# Patient Record
Sex: FEMALE | Race: WHITE | Employment: FULL TIME | URBAN - METROPOLITAN AREA
[De-identification: names, ages, dates, MRNs, and addresses within clinical notes are randomized per-mention and may not be internally consistent; named-entity substitution may affect disease eponyms.]

---

## 2021-09-20 ENCOUNTER — HOSPITAL ENCOUNTER (OUTPATIENT)
Dept: LAB | Age: 31
Discharge: HOME | End: 2021-09-20
Payer: MEDICARE

## 2021-09-20 LAB
SARS-COV-2: NOT DETECTED
SOURCE: NORMAL

## 2021-09-20 PROCEDURE — U0003 INFECTIOUS AGENT DETECTION BY NUCLEIC ACID (DNA OR RNA); SEVERE ACUTE RESPIRATORY SYNDROME CORONAVIRUS 2 (SARS-COV-2) (CORONAVIRUS DISEASE [COVID-19]), AMPLIFIED PROBE TECHNIQUE, MAKING USE OF HIGH THROUGHPUT TECHNOLOGIES AS DESCRIBED BY CMS-2020-01-R: HCPCS

## 2021-09-20 PROCEDURE — U0005 INFEC AGEN DETEC AMPLI PROBE: HCPCS

## 2021-10-04 ENCOUNTER — APPOINTMENT (OUTPATIENT)
Dept: ULTRASOUND IMAGING | Age: 31
DRG: 560 | End: 2021-10-04
Payer: MEDICARE

## 2021-10-04 ENCOUNTER — HOSPITAL ENCOUNTER (OUTPATIENT)
Age: 31
Discharge: HOME | DRG: 560 | End: 2021-10-04
Attending: OBSTETRICS & GYNECOLOGY | Admitting: OBSTETRICS & GYNECOLOGY
Payer: MEDICARE

## 2021-10-04 VITALS
WEIGHT: 208 LBS | OXYGEN SATURATION: 97 % | HEIGHT: 62 IN | DIASTOLIC BLOOD PRESSURE: 63 MMHG | TEMPERATURE: 98.2 F | HEART RATE: 101 BPM | RESPIRATION RATE: 18 BRPM | SYSTOLIC BLOOD PRESSURE: 111 MMHG | BODY MASS INDEX: 38.28 KG/M2

## 2021-10-04 PROBLEM — Z33.1 PREGNANCY AS INCIDENTAL FINDING: Status: ACTIVE | Noted: 2021-10-04

## 2021-10-04 LAB
ALBUMIN SERPL-MCNC: 3.9 GM/DL (ref 3.4–5)
ALP BLD-CCNC: 136 IU/L (ref 40–129)
ALT SERPL-CCNC: 8 U/L (ref 10–40)
AMPHETAMINES: NEGATIVE
ANION GAP SERPL CALCULATED.3IONS-SCNC: 11 MMOL/L (ref 4–16)
AST SERPL-CCNC: 17 IU/L (ref 15–37)
BACTERIA: NEGATIVE /HPF
BARBITURATE SCREEN URINE: NEGATIVE
BENZODIAZEPINE SCREEN, URINE: NEGATIVE
BILIRUB SERPL-MCNC: 0.5 MG/DL (ref 0–1)
BILIRUBIN URINE: NEGATIVE MG/DL
BLOOD, URINE: NEGATIVE
BUN BLDV-MCNC: 7 MG/DL (ref 6–23)
CALCIUM SERPL-MCNC: 9 MG/DL (ref 8.3–10.6)
CANNABINOID SCREEN URINE: NEGATIVE
CHLORIDE BLD-SCNC: 104 MMOL/L (ref 99–110)
CLARITY: CLEAR
CO2: 20 MMOL/L (ref 21–32)
COCAINE METABOLITE: NEGATIVE
COLOR: YELLOW
CREAT SERPL-MCNC: 0.4 MG/DL (ref 0.6–1.1)
CREATININE URINE: 80.8 MG/DL (ref 28–217)
GFR AFRICAN AMERICAN: >60 ML/MIN/1.73M2
GFR NON-AFRICAN AMERICAN: >60 ML/MIN/1.73M2
GLUCOSE BLD-MCNC: 78 MG/DL (ref 70–99)
GLUCOSE, URINE: NEGATIVE MG/DL
HCT VFR BLD CALC: 36.8 % (ref 37–47)
HEMOGLOBIN: 12 GM/DL (ref 12.5–16)
KETONES, URINE: NEGATIVE MG/DL
LEUKOCYTE ESTERASE, URINE: ABNORMAL
MCH RBC QN AUTO: 31.1 PG (ref 27–31)
MCHC RBC AUTO-ENTMCNC: 32.6 % (ref 32–36)
MCV RBC AUTO: 95.3 FL (ref 78–100)
MUCUS: ABNORMAL HPF
NITRITE URINE, QUANTITATIVE: NEGATIVE
OPIATES, URINE: NEGATIVE
OXYCODONE: NEGATIVE
PDW BLD-RTO: 13.2 % (ref 11.7–14.9)
PH, URINE: 7 (ref 5–8)
PHENCYCLIDINE, URINE: NEGATIVE
PLATELET # BLD: 159 K/CU MM (ref 140–440)
PMV BLD AUTO: 11.9 FL (ref 7.5–11.1)
POTASSIUM SERPL-SCNC: 3.9 MMOL/L (ref 3.5–5.1)
PROT/CREAT RATIO, UR: 0.2
PROTEIN UA: NEGATIVE MG/DL
RBC # BLD: 3.86 M/CU MM (ref 4.2–5.4)
RBC URINE: <1 /HPF (ref 0–6)
SODIUM BLD-SCNC: 135 MMOL/L (ref 135–145)
SPECIFIC GRAVITY UA: 1.01 (ref 1–1.03)
SQUAMOUS EPITHELIAL: 3 /HPF
TOTAL PROTEIN: 6.4 GM/DL (ref 6.4–8.2)
TRICHOMONAS: ABNORMAL /HPF
URIC ACID: 3.7 MG/DL (ref 2.6–6)
URINE TOTAL PROTEIN: 12.8 MG/DL
UROBILINOGEN, URINE: NEGATIVE MG/DL (ref 0.2–1)
WBC # BLD: 17 K/CU MM (ref 4–10.5)
WBC UA: 6 /HPF (ref 0–5)

## 2021-10-04 PROCEDURE — 82570 ASSAY OF URINE CREATININE: CPT

## 2021-10-04 PROCEDURE — 84550 ASSAY OF BLOOD/URIC ACID: CPT

## 2021-10-04 PROCEDURE — 84156 ASSAY OF PROTEIN URINE: CPT

## 2021-10-04 PROCEDURE — 76815 OB US LIMITED FETUS(S): CPT

## 2021-10-04 PROCEDURE — 85027 COMPLETE CBC AUTOMATED: CPT

## 2021-10-04 PROCEDURE — 81050 URINALYSIS VOLUME MEASURE: CPT

## 2021-10-04 PROCEDURE — 81001 URINALYSIS AUTO W/SCOPE: CPT

## 2021-10-04 PROCEDURE — 99203 OFFICE O/P NEW LOW 30 MIN: CPT

## 2021-10-04 PROCEDURE — 80053 COMPREHEN METABOLIC PANEL: CPT

## 2021-10-04 PROCEDURE — 80307 DRUG TEST PRSMV CHEM ANLYZR: CPT

## 2021-10-04 RX ORDER — ASPIRIN 81 MG/1
81 TABLET ORAL DAILY
Status: ON HOLD | COMMUNITY
End: 2021-10-08 | Stop reason: HOSPADM

## 2021-10-04 RX ORDER — ACYCLOVIR 200 MG/1
1000 CAPSULE ORAL DAILY
Status: ON HOLD | COMMUNITY
End: 2021-10-08 | Stop reason: HOSPADM

## 2021-10-04 ASSESSMENT — PAIN DESCRIPTION - ORIENTATION: ORIENTATION: OTHER (COMMENT)

## 2021-10-04 ASSESSMENT — PAIN DESCRIPTION - ONSET: ONSET: ON-GOING

## 2021-10-04 ASSESSMENT — PAIN DESCRIPTION - DESCRIPTORS: DESCRIPTORS: DISCOMFORT

## 2021-10-04 ASSESSMENT — PAIN DESCRIPTION - FREQUENCY: FREQUENCY: OTHER (COMMENT)

## 2021-10-04 ASSESSMENT — PAIN DESCRIPTION - PAIN TYPE: TYPE: CHRONIC PAIN

## 2021-10-04 ASSESSMENT — PAIN DESCRIPTION - LOCATION: LOCATION: HEAD

## 2021-10-04 ASSESSMENT — PAIN DESCRIPTION - PROGRESSION: CLINICAL_PROGRESSION: GRADUALLY IMPROVING

## 2021-10-04 ASSESSMENT — PAIN - FUNCTIONAL ASSESSMENT: PAIN_FUNCTIONAL_ASSESSMENT: ACTIVITIES ARE NOT PREVENTED

## 2021-10-04 ASSESSMENT — PAIN SCALES - GENERAL: PAINLEVEL_OUTOF10: 1

## 2021-10-04 NOTE — FLOWSHEET NOTE
Tele  advised of serial B/P and lab work orders received for pt to be discharged home with 24 hour urine and return it tomorrow for serial B/P and NST.

## 2021-10-04 NOTE — FLOWSHEET NOTE
EFM and TOCO on pt states baby has been active and moving,denies any vaginal leaking or bleeding, denies any tender spots to touch on abdomen pt states has had headache off and on beginning on Saturday williams, denies any blurred vision , epigastric discomfort, states B/P has been fine up until this past weekend, POC discussed Ob history taken juice and water given, call light within reach.

## 2021-10-04 NOTE — FLOWSHEET NOTE
EFM And TOCO Off discharge instructions given instructions given regarding beginning, collecting and storing 24 hour urine to return tomorrow to drop off 24 hour urine and have NST and serial B/Ps pt voices understanding, instructed to return to L/D prior to dropping off 24 hour urine if baby not moving at  Least 4-5 times an hour, return if has any vaginal bleeding or leaking   advised may have some spotting if has IC but to return if has gushing of blood or fluid, return if having UC every 4-5 minutes times one hour and cant walk or talk through  Them, also to return If experiences headache , blurred vision or epigastric discomfort, pt voices understanding, discharge paper signed and pt preparing for discharge.

## 2021-10-04 NOTE — FLOWSHEET NOTE
Presents to L/D ambulatory with c/o increased B/P over the weekend with home B/P machine, shown to LT 02 instructed on obtaining urine for CCMS.

## 2021-10-05 ENCOUNTER — HOSPITAL ENCOUNTER (OUTPATIENT)
Age: 31
Discharge: HOME | DRG: 560 | End: 2021-10-05
Attending: OBSTETRICS & GYNECOLOGY | Admitting: OBSTETRICS & GYNECOLOGY
Payer: MEDICARE

## 2021-10-05 VITALS
DIASTOLIC BLOOD PRESSURE: 82 MMHG | BODY MASS INDEX: 38.28 KG/M2 | HEIGHT: 62 IN | SYSTOLIC BLOOD PRESSURE: 127 MMHG | TEMPERATURE: 98.4 F | OXYGEN SATURATION: 98 % | WEIGHT: 208 LBS | HEART RATE: 100 BPM

## 2021-10-05 PROBLEM — Z78.9 ADMITTED TO LABOR AND DELIVERY: Status: ACTIVE | Noted: 2021-10-05

## 2021-10-05 LAB
CREATININE 24 HOUR UR: 1.6 GM/24 HR (ref 0.6–1.5)
Lab: 24 HRS
PROTEIN 24 HOUR URINE: 278 MG/24 HR
VOLUME, (UVOL): 1350 MLS

## 2021-10-05 PROCEDURE — 59025 FETAL NON-STRESS TEST: CPT

## 2021-10-05 NOTE — FLOWSHEET NOTE
on unit serial B/P reviewed  24 hour urine results  orders received for pt to be discharged an follow up  In office this week for B/P check and will discuss induction.

## 2021-10-05 NOTE — FLOWSHEET NOTE
Patient ambulatory to labor and delivery unit. Patient here to drop off a 24hr unine, scheduled NST, and sereal BP's. EFM and TOCO applied and tracing. Patient abdomen is nontender to touch. She denies any painful contractions, leaking of fluid, or bleeding. Patient denies any headache today. Bilateral lower extremity reflex's brisk at assessment. Negative for clonus. Dr Michelle Martins at nurses station and notified of patient arrival. Awaiting orders. POC discussed with patient. She verbalizes understanding and denies any questions at this time.

## 2021-10-05 NOTE — FLOWSHEET NOTE
EFM and TOCO off discharge instructions given to follow up as scheduled in office on Thursday and will discuss  Possible induction, instructed to return to l/D if develops headache , blurred vision, or epigastric discomfort, instructed to return if baby not moving at least 4-5 times an hour and cant walk or talk through them , baby not moving 4-5 an hour or if has any vaginal leakiing or bleeding, pt voices understanding discharge paper signed and pt preparing for discharge

## 2021-10-07 ENCOUNTER — ANESTHESIA EVENT (OUTPATIENT)
Dept: LABOR AND DELIVERY | Age: 31
DRG: 560 | End: 2021-10-07
Payer: MEDICARE

## 2021-10-07 ENCOUNTER — ANESTHESIA (OUTPATIENT)
Dept: LABOR AND DELIVERY | Age: 31
DRG: 560 | End: 2021-10-07
Payer: MEDICARE

## 2021-10-07 ENCOUNTER — HOSPITAL ENCOUNTER (INPATIENT)
Age: 31
LOS: 2 days | Discharge: HOME | DRG: 560 | End: 2021-10-09
Attending: OBSTETRICS & GYNECOLOGY | Admitting: OBSTETRICS & GYNECOLOGY
Payer: MEDICARE

## 2021-10-07 LAB
ABO/RH: NORMAL
ALBUMIN SERPL-MCNC: 3.7 GM/DL (ref 3.4–5)
ALP BLD-CCNC: 136 IU/L (ref 40–129)
ALT SERPL-CCNC: 8 U/L (ref 10–40)
AMORPHOUS: ABNORMAL /HPF
AMPHETAMINES: NEGATIVE
ANION GAP SERPL CALCULATED.3IONS-SCNC: 13 MMOL/L (ref 4–16)
ANTIBODY SCREEN: NEGATIVE
AST SERPL-CCNC: 21 IU/L (ref 15–37)
BACTERIA: NEGATIVE /HPF
BARBITURATE SCREEN URINE: NEGATIVE
BENZODIAZEPINE SCREEN, URINE: NEGATIVE
BILIRUB SERPL-MCNC: 0.2 MG/DL (ref 0–1)
BILIRUBIN URINE: NEGATIVE MG/DL
BLOOD, URINE: ABNORMAL
BUN BLDV-MCNC: 11 MG/DL (ref 6–23)
CALCIUM SERPL-MCNC: 9.1 MG/DL (ref 8.3–10.6)
CANNABINOID SCREEN URINE: NEGATIVE
CHLORIDE BLD-SCNC: 104 MMOL/L (ref 99–110)
CLARITY: ABNORMAL
CO2: 18 MMOL/L (ref 21–32)
COCAINE METABOLITE: NEGATIVE
COLOR: YELLOW
CREAT SERPL-MCNC: 0.4 MG/DL (ref 0.6–1.1)
GFR AFRICAN AMERICAN: >60 ML/MIN/1.73M2
GFR NON-AFRICAN AMERICAN: >60 ML/MIN/1.73M2
GLUCOSE BLD-MCNC: 89 MG/DL (ref 70–99)
GLUCOSE, URINE: NEGATIVE MG/DL
HCT VFR BLD CALC: 35 % (ref 37–47)
HEMOGLOBIN: 11.3 GM/DL (ref 12.5–16)
KETONES, URINE: NEGATIVE MG/DL
LEUKOCYTE ESTERASE, URINE: ABNORMAL
MCH RBC QN AUTO: 30.5 PG (ref 27–31)
MCHC RBC AUTO-ENTMCNC: 32.3 % (ref 32–36)
MCV RBC AUTO: 94.3 FL (ref 78–100)
MUCUS: ABNORMAL HPF
NITRITE URINE, QUANTITATIVE: NEGATIVE
OPIATES, URINE: NEGATIVE
OXYCODONE: NEGATIVE
PDW BLD-RTO: 13.3 % (ref 11.7–14.9)
PH, URINE: 6 (ref 5–8)
PHENCYCLIDINE, URINE: NEGATIVE
PLATELET # BLD: 175 K/CU MM (ref 140–440)
PMV BLD AUTO: 11.7 FL (ref 7.5–11.1)
POTASSIUM SERPL-SCNC: 3.9 MMOL/L (ref 3.5–5.1)
PROTEIN UA: NEGATIVE MG/DL
RBC # BLD: 3.71 M/CU MM (ref 4.2–5.4)
RBC URINE: <1 /HPF (ref 0–6)
SODIUM BLD-SCNC: 135 MMOL/L (ref 135–145)
SPECIFIC GRAVITY UA: 1.02 (ref 1–1.03)
SQUAMOUS EPITHELIAL: 2 /HPF
TOTAL PROTEIN: 6.2 GM/DL (ref 6.4–8.2)
TRICHOMONAS: ABNORMAL /HPF
URIC ACID: 4.1 MG/DL (ref 2.6–6)
UROBILINOGEN, URINE: NEGATIVE MG/DL (ref 0.2–1)
WBC # BLD: 16.2 K/CU MM (ref 4–10.5)
WBC UA: 12 /HPF (ref 0–5)

## 2021-10-07 PROCEDURE — 84550 ASSAY OF BLOOD/URIC ACID: CPT

## 2021-10-07 PROCEDURE — 81001 URINALYSIS AUTO W/SCOPE: CPT

## 2021-10-07 PROCEDURE — 85027 COMPLETE CBC AUTOMATED: CPT

## 2021-10-07 PROCEDURE — 7200000001 HC VAGINAL DELIVERY

## 2021-10-07 PROCEDURE — 86900 BLOOD TYPING SEROLOGIC ABO: CPT

## 2021-10-07 PROCEDURE — 6360000002 HC RX W HCPCS: Performed by: OBSTETRICS & GYNECOLOGY

## 2021-10-07 PROCEDURE — 1220000000 HC SEMI PRIVATE OB R&B

## 2021-10-07 PROCEDURE — 10907ZC DRAINAGE OF AMNIOTIC FLUID, THERAPEUTIC FROM PRODUCTS OF CONCEPTION, VIA NATURAL OR ARTIFICIAL OPENING: ICD-10-PCS | Performed by: OBSTETRICS & GYNECOLOGY

## 2021-10-07 PROCEDURE — 2580000003 HC RX 258: Performed by: OBSTETRICS & GYNECOLOGY

## 2021-10-07 PROCEDURE — 6360000002 HC RX W HCPCS: Performed by: NURSE ANESTHETIST, CERTIFIED REGISTERED

## 2021-10-07 PROCEDURE — 80053 COMPREHEN METABOLIC PANEL: CPT

## 2021-10-07 PROCEDURE — 51702 INSERT TEMP BLADDER CATH: CPT

## 2021-10-07 PROCEDURE — 6370000000 HC RX 637 (ALT 250 FOR IP): Performed by: OBSTETRICS & GYNECOLOGY

## 2021-10-07 PROCEDURE — 86901 BLOOD TYPING SEROLOGIC RH(D): CPT

## 2021-10-07 PROCEDURE — 80307 DRUG TEST PRSMV CHEM ANLYZR: CPT

## 2021-10-07 PROCEDURE — 86850 RBC ANTIBODY SCREEN: CPT

## 2021-10-07 RX ORDER — HYDROCODONE BITARTRATE AND ACETAMINOPHEN 5; 325 MG/1; MG/1
2 TABLET ORAL EVERY 4 HOURS PRN
Status: DISCONTINUED | OUTPATIENT
Start: 2021-10-07 | End: 2021-10-09 | Stop reason: HOSPADM

## 2021-10-07 RX ORDER — DOCUSATE SODIUM 100 MG/1
100 CAPSULE, LIQUID FILLED ORAL 2 TIMES DAILY
Status: DISCONTINUED | OUTPATIENT
Start: 2021-10-07 | End: 2021-10-09 | Stop reason: HOSPADM

## 2021-10-07 RX ORDER — ROPIVACAINE HYDROCHLORIDE 2 MG/ML
INJECTION, SOLUTION EPIDURAL; INFILTRATION; PERINEURAL PRN
Status: DISCONTINUED | OUTPATIENT
Start: 2021-10-07 | End: 2021-10-07 | Stop reason: SDUPTHER

## 2021-10-07 RX ORDER — ROPIVACAINE HYDROCHLORIDE 2 MG/ML
12 INJECTION, SOLUTION EPIDURAL; INFILTRATION; PERINEURAL CONTINUOUS
Status: DISCONTINUED | OUTPATIENT
Start: 2021-10-07 | End: 2021-10-09 | Stop reason: HOSPADM

## 2021-10-07 RX ORDER — ONDANSETRON 4 MG/1
8 TABLET, ORALLY DISINTEGRATING ORAL EVERY 8 HOURS PRN
Status: DISCONTINUED | OUTPATIENT
Start: 2021-10-07 | End: 2021-10-09 | Stop reason: HOSPADM

## 2021-10-07 RX ORDER — FENTANYL CITRATE 50 UG/ML
100 INJECTION, SOLUTION INTRAMUSCULAR; INTRAVENOUS
Status: DISCONTINUED | OUTPATIENT
Start: 2021-10-07 | End: 2021-10-07 | Stop reason: HOSPADM

## 2021-10-07 RX ORDER — FERROUS SULFATE 325(65) MG
325 TABLET ORAL 2 TIMES DAILY WITH MEALS
Status: DISCONTINUED | OUTPATIENT
Start: 2021-10-07 | End: 2021-10-09 | Stop reason: HOSPADM

## 2021-10-07 RX ORDER — LANOLIN 100 %
OINTMENT (GRAM) TOPICAL PRN
Status: DISCONTINUED | OUTPATIENT
Start: 2021-10-07 | End: 2021-10-09 | Stop reason: HOSPADM

## 2021-10-07 RX ORDER — SIMETHICONE 80 MG
80 TABLET,CHEWABLE ORAL EVERY 6 HOURS PRN
Status: DISCONTINUED | OUTPATIENT
Start: 2021-10-07 | End: 2021-10-09 | Stop reason: HOSPADM

## 2021-10-07 RX ORDER — ONDANSETRON 2 MG/ML
4 INJECTION INTRAMUSCULAR; INTRAVENOUS EVERY 6 HOURS PRN
Status: DISCONTINUED | OUTPATIENT
Start: 2021-10-07 | End: 2021-10-07 | Stop reason: HOSPADM

## 2021-10-07 RX ORDER — SODIUM CHLORIDE 9 MG/ML
25 INJECTION, SOLUTION INTRAVENOUS PRN
Status: DISCONTINUED | OUTPATIENT
Start: 2021-10-07 | End: 2021-10-09 | Stop reason: HOSPADM

## 2021-10-07 RX ORDER — SODIUM CHLORIDE 0.9 % (FLUSH) 0.9 %
10 SYRINGE (ML) INJECTION EVERY 12 HOURS SCHEDULED
Status: DISCONTINUED | OUTPATIENT
Start: 2021-10-07 | End: 2021-10-09 | Stop reason: HOSPADM

## 2021-10-07 RX ORDER — SODIUM CHLORIDE 0.9 % (FLUSH) 0.9 %
10 SYRINGE (ML) INJECTION PRN
Status: DISCONTINUED | OUTPATIENT
Start: 2021-10-07 | End: 2021-10-09 | Stop reason: HOSPADM

## 2021-10-07 RX ORDER — IBUPROFEN 800 MG/1
800 TABLET ORAL EVERY 8 HOURS
Status: DISCONTINUED | OUTPATIENT
Start: 2021-10-07 | End: 2021-10-09 | Stop reason: HOSPADM

## 2021-10-07 RX ORDER — BISACODYL 10 MG
10 SUPPOSITORY, RECTAL RECTAL DAILY PRN
Status: DISCONTINUED | OUTPATIENT
Start: 2021-10-07 | End: 2021-10-09 | Stop reason: HOSPADM

## 2021-10-07 RX ORDER — SODIUM CHLORIDE, SODIUM LACTATE, POTASSIUM CHLORIDE, CALCIUM CHLORIDE 600; 310; 30; 20 MG/100ML; MG/100ML; MG/100ML; MG/100ML
INJECTION, SOLUTION INTRAVENOUS CONTINUOUS
Status: DISCONTINUED | OUTPATIENT
Start: 2021-10-07 | End: 2021-10-09 | Stop reason: HOSPADM

## 2021-10-07 RX ORDER — LIDOCAINE HYDROCHLORIDE 10 MG/ML
30 INJECTION, SOLUTION EPIDURAL; INFILTRATION; INTRACAUDAL; PERINEURAL PRN
Status: DISCONTINUED | OUTPATIENT
Start: 2021-10-07 | End: 2021-10-09 | Stop reason: HOSPADM

## 2021-10-07 RX ORDER — HYDROCODONE BITARTRATE AND ACETAMINOPHEN 5; 325 MG/1; MG/1
1 TABLET ORAL EVERY 4 HOURS PRN
Status: DISCONTINUED | OUTPATIENT
Start: 2021-10-07 | End: 2021-10-09 | Stop reason: HOSPADM

## 2021-10-07 RX ORDER — ACETAMINOPHEN 325 MG/1
650 TABLET ORAL EVERY 4 HOURS PRN
Status: DISCONTINUED | OUTPATIENT
Start: 2021-10-07 | End: 2021-10-09 | Stop reason: HOSPADM

## 2021-10-07 RX ADMIN — Medication 87.3 MILLI-UNITS/MIN: at 11:23

## 2021-10-07 RX ADMIN — AMPICILLIN SODIUM 1000 MG: 1 INJECTION, POWDER, FOR SOLUTION INTRAMUSCULAR; INTRAVENOUS at 09:56

## 2021-10-07 RX ADMIN — AMPICILLIN SODIUM 2000 MG: 2 INJECTION, POWDER, FOR SOLUTION INTRAMUSCULAR; INTRAVENOUS at 05:15

## 2021-10-07 RX ADMIN — ROPIVACAINE HYDROCHLORIDE 8 ML: 2 INJECTION, SOLUTION EPIDURAL; INFILTRATION at 07:52

## 2021-10-07 RX ADMIN — SODIUM CHLORIDE, POTASSIUM CHLORIDE, SODIUM LACTATE AND CALCIUM CHLORIDE: 600; 310; 30; 20 INJECTION, SOLUTION INTRAVENOUS at 07:57

## 2021-10-07 RX ADMIN — Medication 1 MILLI-UNITS/MIN: at 10:27

## 2021-10-07 RX ADMIN — SODIUM CHLORIDE, POTASSIUM CHLORIDE, SODIUM LACTATE AND CALCIUM CHLORIDE: 600; 310; 30; 20 INJECTION, SOLUTION INTRAVENOUS at 05:00

## 2021-10-07 RX ADMIN — DOCUSATE SODIUM 100 MG: 100 CAPSULE ORAL at 20:28

## 2021-10-07 RX ADMIN — IBUPROFEN 800 MG: 800 TABLET, FILM COATED ORAL at 18:40

## 2021-10-07 RX ADMIN — ROPIVACAINE HYDROCHLORIDE 12 ML/HR: 2 INJECTION, SOLUTION EPIDURAL; INFILTRATION at 07:53

## 2021-10-07 RX ADMIN — SODIUM CHLORIDE, PRESERVATIVE FREE 10 ML: 5 INJECTION INTRAVENOUS at 20:29

## 2021-10-07 ASSESSMENT — PAIN SCALES - GENERAL
PAINLEVEL_OUTOF10: 0
PAINLEVEL_OUTOF10: 0
PAINLEVEL_OUTOF10: 6
PAINLEVEL_OUTOF10: 0

## 2021-10-07 ASSESSMENT — PAIN DESCRIPTION - DESCRIPTORS: DESCRIPTORS: CRAMPING

## 2021-10-07 ASSESSMENT — PAIN - FUNCTIONAL ASSESSMENT: PAIN_FUNCTIONAL_ASSESSMENT: ACTIVITIES ARE NOT PREVENTED

## 2021-10-07 ASSESSMENT — PAIN DESCRIPTION - FREQUENCY: FREQUENCY: CONTINUOUS

## 2021-10-07 ASSESSMENT — PAIN DESCRIPTION - LOCATION: LOCATION: ABDOMEN

## 2021-10-07 ASSESSMENT — PAIN DESCRIPTION - ONSET: ONSET: ON-GOING

## 2021-10-07 ASSESSMENT — PAIN DESCRIPTION - PAIN TYPE: TYPE: ACUTE PAIN

## 2021-10-07 ASSESSMENT — PAIN DESCRIPTION - PROGRESSION: CLINICAL_PROGRESSION: GRADUALLY WORSENING

## 2021-10-07 ASSESSMENT — PAIN DESCRIPTION - ORIENTATION: ORIENTATION: LOWER

## 2021-10-07 NOTE — FLOWSHEET NOTE
Epidural:    CRNA in room: 1 [facility]  Cath: [id number]  Test dose: 9706  Bolus: 1968    RN remained at bedside. Pt tolerated procedure well.

## 2021-10-07 NOTE — H&P
Department of Obstetrics and Gynecology   Obstetrics History and Physical        CHIEF COMPLAINT:  contractions    HISTORY OF PRESENT ILLNESS:      The patient is a 32 y.o. female at 38w3d. OB History        2    Para   1    Term   1            AB        Living           SAB        TAB        Ectopic        Molar        Multiple        Live Births                Patient presents with a chief complaint as above and is being admitted for active phase labor    Estimated Due Date: Estimated Date of Delivery: 10/11/21    PRENATAL CARE:    Complicated by: HSV - on suppression, asthma    PAST OB HISTORY  OB History        2    Para   1    Term   1            AB        Living           SAB        TAB        Ectopic        Molar        Multiple        Live Births                    Past Medical History:        Diagnosis Date    Asthma      Past Surgical History:    No past surgical history on file. Allergies:  Patient has no known allergies. Social History:    Social History     Socioeconomic History    Marital status:      Spouse name: Not on file    Number of children: Not on file    Years of education: Not on file    Highest education level: Not on file   Occupational History    Not on file   Tobacco Use    Smoking status: Never Smoker   Substance and Sexual Activity    Alcohol use: No    Drug use: No    Sexual activity: Yes     Partners: Male   Other Topics Concern    Not on file   Social History Narrative    Not on file     Social Determinants of Health     Financial Resource Strain:     Difficulty of Paying Living Expenses:    Food Insecurity:     Worried About Running Out of Food in the Last Year:     [address] in the Last Year:    Transportation Needs:     Lack of Transportation (Medical):      Lack of Transportation (Non-Medical):    Physical Activity:     Days of Exercise per Week:     Minutes of Exercise per Session:    Stress:     Feeling of Stress : Social Connections:     Frequency of Communication with Friends and Family:     Frequency of Social Gatherings with Friends and Family:     Attends [employer] Services:     Active Member of Clubs or Organizations:     Attends Club or Organization Meetings:     Marital Status:    Intimate Partner Violence:     Fear of Current or Ex-Partner:     Emotionally Abused:     Physically Abused:     Sexually Abused:      Family History:   No family history on file. Medications Prior to Admission:  Medications Prior to Admission: aspirin 81 MG EC tablet, Take 81 mg by mouth daily  albuterol sulfate  (90 Base) MCG/ACT inhaler, Inhale 2 puffs into the lungs every 6 hours as needed for Wheezing or Shortness of Breath (or cough) Please include spacer with instructions for use. calcium carbonate (TUMS) 500 MG chewable tablet, Take 1 tablet by mouth daily  Prenatal MV-Min-Fe Fum-FA-DHA (PRENATAL 1 PO), Take by mouth  IRON PO, Take by mouth  acyclovir (ZOVIRAX) 200 MG capsule, Take 1,000 mg by mouth daily  Dextromethorphan-guaiFENesin (ROBITUSSIN DM)  MG/5ML SYRP, Take 5 mLs by mouth every 4 hours as needed for Cough    REVIEW OF SYSTEMS:    CONSTITUTIONAL:  negative  RESPIRATORY:  negative  CARDIOVASCULAR:  negative  GASTROINTESTINAL:  negative  ALLERGIC/IMMUNOLOGIC:  negative  NEUROLOGICAL:  negative  BEHAVIOR/PSYCH:  negative    PHYSICAL EXAM:  Blood pressure (!) 142/88, pulse 106, temperature 98.2 °F (36.8 °C), temperature source Oral, resp. rate 18, height 5' 2\" (1.575 m), weight 210 lb (95.3 kg), SpO2 99 %, unknown if currently breastfeeding. General appearance:  awake, alert, cooperative, no apparent distress, and appears stated age  Neurologic:  Awake, alert, oriented to name, place and time.     Lungs:  No increased work of breathing, good air exchange  Abdomen:  Soft, non tender, gravid, consistent with her gestational age  Fetal heart rate:    Baseline Heart Rate:   Category 1 tracing     Pelvis: Adequate pelvis  Cervix: 7 cm  With AROM around 0715 this am  Contraction frequency:  2 minutes    Membranes:  Ruptured clear fluid    ASSESSMENT AND PLAN:    Labor: Admit, anticipate normal delivery, routine labor orders  Fetus: Reassuring  GBS: Yes  Other: IV antibiotic therapy

## 2021-10-07 NOTE — PLAN OF CARE
Problem: Anxiety:  Goal: Level of anxiety will decrease  Description: Level of anxiety will decrease  10/7/2021 1405 by Debi Price RN  Outcome: Met This Shift  10/7/2021 0517 by Mamta Ng RN  Outcome: Ongoing     Problem: Breathing Pattern - Ineffective:  Goal: Able to breathe comfortably  Description: Able to breathe comfortably  10/7/2021 1405 by Debi Price RN  Outcome: Met This Shift  10/7/2021 0517 by Mamta Ng RN  Outcome: Ongoing     Problem: Fluid Volume - Imbalance:  Goal: Absence of imbalanced fluid volume signs and symptoms  Description: Absence of imbalanced fluid volume signs and symptoms  10/7/2021 1405 by Debi Price RN  Outcome: Met This Shift  10/7/2021 0517 by Mamta Ng RN  Outcome: Ongoing  Goal: Absence of intrapartum hemorrhage signs and symptoms  Description: Absence of intrapartum hemorrhage signs and symptoms  10/7/2021 1405 by Debi Price RN  Outcome: Met This Shift  10/7/2021 0517 by Mamta Ng RN  Outcome: Ongoing     Problem: Infection - Intrapartum Infection:  Goal: Will show no infection signs and symptoms  Description: Will show no infection signs and symptoms  10/7/2021 1405 by Debi Price RN  Outcome: Met This Shift  10/7/2021 0517 by Mamta Ng RN  Outcome: Ongoing     Problem: Labor Process - Prolonged:  Goal: Labor progression, first stage, within specified pattern  Description: Labor progression, first stage, within specified pattern  10/7/2021 1405 by Debi Price RN  Outcome: Met This Shift  10/7/2021 0517 by Mamta Ng RN  Outcome: Ongoing  Goal: Labor progession, second stage, within specified pattern  Description: Labor progession, second stage, within specified pattern  10/7/2021 1405 by Debi Price RN  Outcome: Met This Shift  10/7/2021 0517 by Mamta Ng RN  Outcome: Ongoing  Goal: Uterine contractions within specified parameters  Description: Uterine contractions within specified parameters  10/7/2021 1405 by Bhargavi Costello RN  Outcome: Met This Shift  10/7/2021 0517 by Carmen Rodrigez RN  Outcome: Ongoing     Problem:  Screening:  Goal: Ability to make informed decisions regarding treatment has improved  Description: Ability to make informed decisions regarding treatment has improved  10/7/2021 140 by Bhargavi Costello RN  Outcome: Met This Shift  10/7/2021 0517 by Carmen Rodrigez RN  Outcome: Ongoing     Problem: Pain - Acute:  Goal: Pain level will decrease  Description: Pain level will decrease  10/7/2021 1405 by Bhargavi Costello RN  Outcome: Met This Shift  10/7/2021 0517 by Carmen Rodrigez RN  Outcome: Ongoing  Goal: Able to cope with pain  Description: Able to cope with pain  10/7/2021 1405 by Bhargavi Costello RN  Outcome: Met This Shift  10/7/2021 0517 by Carmen Rodrigez RN  Outcome: Ongoing     Problem: Tissue Perfusion - Uteroplacental, Altered:  Description: [TRUNCATED] For intrapartum patients with recurrent variable decelerations of the fetal heart rate, consider transcervical amnioinfusion. For patients in labor, avoid prophylactic use of continuous maternal oxygen supplementation to prevent nonreassu . ..   Goal: Absence of abnormal fetal heart rate pattern  Description: Absence of abnormal fetal heart rate pattern  10/7/2021 1405 by Bhargavi Costello RN  Outcome: Met This Shift  10/7/2021 0517 by Carmen Rodrigez RN  Outcome: Ongoing     Problem: Urinary Retention:  Goal: Experiences of bladder distention will decrease  Description: Experiences of bladder distention will decrease  10/7/2021 140 by Bhargavi Costello RN  Outcome: Met This Shift  10/7/2021 0517 by Carmen Rodrigez RN  Outcome: Ongoing  Goal: Urinary elimination within specified parameters  Description: Urinary elimination within specified parameters  10/7/2021 1405 by Bhargavi Costello RN  Outcome: Met This Shift  10/7/2021 0517 by Carmen Rodrigez RN  Outcome: Ongoing Problem: Pain:  Description: Pain management should include both nonpharmacologic and pharmacologic interventions.   Goal: Pain level will decrease  Description: Pain level will decrease  10/7/2021 1405 by Lorne Lewis RN  Outcome: Met This Shift  10/7/2021 0517 by Rafaela Dickinson RN  Outcome: Ongoing  Goal: Control of acute pain  Description: Control of acute pain  10/7/2021 1405 by Lorne Lewis RN  Outcome: Met This Shift  10/7/2021 0517 by Rafaela Dickinson RN  Outcome: Ongoing  Goal: Control of chronic pain  Description: Control of chronic pain  10/7/2021 1405 by Lorne Lewis RN  Outcome: Met This Shift  10/7/2021 0517 by Rafaela Dickinson RN  Outcome: Ongoing

## 2021-10-07 NOTE — ANESTHESIA PRE PROCEDURE
Department of Anesthesiology  Preprocedure Note       Name:  Patria Paige   Age:  32 y.o.  :  1990                                          MRN:  [id number]         Date:  10/7/2021      Surgeon: * No surgeons listed *    Procedure: * No procedures listed *    Medications prior to admission:   Prior to Admission medications    Medication Sig Start Date End Date Taking? Authorizing Provider   aspirin 81 MG EC tablet Take 81 mg by mouth daily   Yes Historical Provider, MD   albuterol sulfate  (90 Base) MCG/ACT inhaler Inhale 2 puffs into the lungs every 6 hours as needed for Wheezing or Shortness of Breath (or cough) Please include spacer with instructions for use.  10/30/17  Yes Racheal Rodriguez PA-C   calcium carbonate (TUMS) 500 MG chewable tablet Take 1 tablet by mouth daily   Yes Historical Provider, MD   Prenatal MV-Min-Fe Fum-FA-DHA (PRENATAL 1 PO) Take by mouth   Yes Historical Provider, MD   IRON PO Take by mouth   Yes Historical Provider, MD   acyclovir (ZOVIRAX) 200 MG capsule Take 1,000 mg by mouth daily    Historical Provider, MD   Dextromethorphan-guaiFENesin (ROBITUSSIN DM)  MG/5ML SYRP Take 5 mLs by mouth every 4 hours as needed for Cough 10/30/17   Racheal Rodriguez PA-C       Current medications:    Current Facility-Administered Medications   Medication Dose Route Frequency Provider Last Rate Last Admin    lactated ringers infusion   IntraVENous Continuous [facility] Brandee Santos  mL/hr at 10/07/21 0500 New Bag at 10/07/21 0500    oxytocin (PITOCIN) 30 units in 500 mL infusion  1-20 spenser-units/min IntraVENous Continuous Eliz Metzger MD   Held at 10/07/21 0541    oxytocin (PITOCIN) 30 units in 500 mL infusion  87.3 spenser-units/min IntraVENous Continuous PRN Eliz Metzger MD        And    oxytocin (PITOCIN) 10 unit bolus from the bag  10 Units IntraVENous PRN Eliz Metzger MD        lidocaine PF 1 % injection 30 mL  30 mL Other PRN Katherine Little MD        fentaNYL (SUBLIMAZE) injection 100 mcg  100 mcg IntraVENous Q1H PRN Katherine Little MD        famotidine (PEPCID) injection 20 mg  20 mg IntraVENous BID PRN Katherine Little MD        [facility]etron [facility]) injection 4 mg  4 mg IntraVENous Q6H PRN Katherine Little MD        ampicillin 1000 mg ivpb mini bag  1,000 mg IntraVENous Q4H Andrew Montoya MD           Allergies:  No Known Allergies    Problem List:    Patient Active Problem List   Diagnosis Code    Gestational hypertension w/o significant proteinuria in 3rd trimester O13.3    Pregnancy as incidental finding Z33.1    Admitted to labor and delivery Z78.9    Labor and delivery indication for care or intervention O75.9       Past Medical History:        Diagnosis Date    Asthma        Past Surgical History:  No past surgical history on file. Social History:    Social History     Tobacco Use    Smoking status: Never Smoker   Substance Use Topics    Alcohol use: No                                Counseling given: Not Answered      Vital Signs (Current):   Vitals:    10/07/21 0432   BP: (!) 142/88   Pulse: 106   Resp: 18   Temp: 36.8 °C (98.2 °F)   TempSrc: Oral   SpO2: 99%   Weight: 210 lb (95.3 kg)   Height: 5' 2\" (1.575 m)                                              BP Readings from Last 3 Encounters:   10/07/21 (!) 142/88   10/05/21 127/82   10/04/21 111/63       NPO Status:                                                                                 BMI:   Wt Readings from Last 3 Encounters:   10/07/21 210 lb (95.3 kg)   10/05/21 208 lb (94.3 kg)   10/04/21 208 lb (94.3 kg)     Body mass index is 38.41 kg/m².     CBC:   Lab Results   Component Value Date    WBC 16.2 10/07/2021    RBC 3.71 10/07/2021    HGB 11.3 10/07/2021    HCT 35.0 10/07/2021    MCV 94.3 10/07/2021    RDW 13.3 10/07/2021     10/07/2021       CMP:   Lab Results   Component Value Date     10/07/2021 K 3.9 10/07/2021     10/07/2021    CO2 18 10/07/2021    BUN 11 10/07/2021    CREATININE 0.4 10/07/2021    GFRAA >60 10/07/2021    LABGLOM >60 10/07/2021    GLUCOSE 89 10/07/2021    PROT 6.2 10/07/2021    CALCIUM 9.1 10/07/2021    BILITOT 0.2 10/07/2021    ALKPHOS 136 10/07/2021    AST 21 10/07/2021    ALT 8 10/07/2021       POC Tests: No results for input(s): POCGLU, POCNA, POCK, POCCL, POCBUN, POCHEMO, POCHCT in the last 72 hours. Coags: No results found for: PROTIME, INR, APTT    HCG (If Applicable): No results found for: PREGTESTUR, PREGSERUM, HCG, HCGQUANT     ABGs: No results found for: PHART, PO2ART, SBX6LRF, EER8XVZ, BEART, H5TBOAOQ     Type & Screen (If Applicable):  No results found for: LABABO, LABRH    Drug/Infectious Status (If Applicable):  No results found for: HIV, HEPCAB    COVID-19 Screening (If Applicable):   Lab Results   Component Value Date    COVID19 NOT DETECTED 09/20/2021           Anesthesia Evaluation  Patient summary reviewed no history of anesthetic complications:   Airway: Mallampati: II  TM distance: >3 FB   Neck ROM: full  Mouth opening: > = 3 FB Dental: normal exam         Pulmonary:normal exam    (+) asthma:                            Cardiovascular:    (+) hypertension:,                   Neuro/Psych:               GI/Hepatic/Renal:   (+) morbid obesity          Endo/Other:                     Abdominal:             Vascular: Other Findings:             Anesthesia Plan      epidural     ASA 2             Anesthetic plan and risks discussed with patient.                       NICOLE Melgar - RONALDO   10/7/2021

## 2021-10-07 NOTE — PROGRESS NOTES
Pt ambulatory arrival to unit for c/o  contractions. Pt shown to TR-02 oriented to room, instructed to change into gown and obtain CC urine sample.  Pt reports positive FM, denies bleeding or leaking of fluid, POC discussed, call light within reach

## 2021-10-07 NOTE — PROGRESS NOTES
TR to  ,updated on pt recent SVE and contraction status, new orders to admit to labor pathway at this time. Pt updated on POC, moved to LD-03.

## 2021-10-07 NOTE — PLAN OF CARE
Problem: Fluid Volume - Imbalance:  Goal: Absence of imbalanced fluid volume signs and symptoms  Description: Absence of imbalanced fluid volume signs and symptoms  10/7/2021 1626 by Allen Gamez  Outcome: Ongoing  10/7/2021 1405 by Chantel Dunlap RN  Outcome: Met This Shift  10/7/2021 0517 by Bashir Steinberg RN  Outcome: Ongoing  Goal: Absence of intrapartum hemorrhage signs and symptoms  Description: Absence of intrapartum hemorrhage signs and symptoms  10/7/2021 1626 by Allen Gamez  Outcome: Ongoing  10/7/2021 1405 by Chantel Dunlap RN  Outcome: Met This Shift  10/7/2021 0517 by Bashir Steinberg RN  Outcome: Ongoing  Goal: Absence of postpartum hemorrhage signs and symptoms  Description: Absence of postpartum hemorrhage signs and symptoms  10/7/2021 1626 by Allen Gamez  Outcome: Ongoing     Problem: Infection - Intrapartum Infection:  Goal: Will show no infection signs and symptoms  Description: Will show no infection signs and symptoms  10/7/2021 1626 by Allen Gamez  Outcome: Ongoing  10/7/2021 1405 by Chantel Dunlap RN  Outcome: Met This Shift  10/7/2021 0517 by Bashir Steinberg RN  Outcome: Ongoing     Problem: Pain - Acute:  Goal: Pain level will decrease  Description: Pain level will decrease  10/7/2021 1626 by Allen Gamez  Outcome: Ongoing  10/7/2021 1405 by Chantel Dunlap RN  Outcome: Met This Shift  10/7/2021 0517 by Bashir Steinberg RN  Outcome: Ongoing  Goal: Able to cope with pain  Description: Able to cope with pain  10/7/2021 1626 by Allen Gamez  Outcome: Ongoing  10/7/2021 1405 by Chantel Dunlap RN  Outcome: Met This Shift  10/7/2021 0517 by Bashir Steinberg RN  Outcome: Ongoing     Problem: Pain:  Goal: Pain level will decrease  Description: Pain level will decrease  10/7/2021 1626 by Allen Gamez  Outcome: Ongoing  10/7/2021 1405 by Chantel Dunlap RN  Outcome: Met This Shift  10/7/2021 0517 by Bashir Steinberg RN  Outcome: Ongoing     Problem: Discharge Planning:  Goal: Discharged to appropriate level of care  Description: Discharged to appropriate level of care  10/7/2021 1626 by Nik Barnes  Outcome: Ongoing     Problem: Constipation:  Goal: Bowel elimination is within specified parameters  Description: Bowel elimination is within specified parameters  10/7/2021 1626 by Nik Barnes  Outcome: Ongoing     Problem: Infection - Risk of, Puerperal Infection:  Goal: Will show no infection signs and symptoms  Description: Will show no infection signs and symptoms  10/7/2021 1626 by Nik Barnes  Outcome: Ongoing  10/7/2021 1405 by Artis Bateman, RN  Outcome: Met This Shift  10/7/2021 0517 by Eder Mcdonald, RN  Outcome: Ongoing     Problem: Mood - Altered:  Goal: Mood stable  Description: Mood stable  10/7/2021 1626 by Nik Barnes  Outcome: Ongoing

## 2021-10-07 NOTE — FLOWSHEET NOTE
Patient from LD to mother and baby via wheelchair. Stable and oriented to room. Assessment completed. Educated patients on visitation policy and paperwork. Baby is in room with mother. Call light within reach. Patient declines any needs at this time.

## 2021-10-07 NOTE — L&D DELIVERY NOTE
Mother's Information    Labor Events     labor?: No  Rupture type: Artificial=AROM, Intact  Fluid color: Clear  Fluid odor: None     Mother Delivery Information    Episiotomy: None  Lacerations: None  Repair Suture: None  Surgical or Additional Est. Blood Loss (mL): 0 (View Only): Edit in Flowsheets   Combined Est. Blood Loss (mL): 0        Laurent, Baby Pending Ayanna Comer [[id number]]    Labor Events     labor?: No   steroids?: None  Cervical ripening date/time:     Antibiotics received during labor?: No  Rupture date/time: 10/7/21 07:17:00   Rupture type: Artificial=AROM, Intact  Fluid color: Clear  Fluid odor: None  Induction: None          Labor Event Times    Labor onset date/time: 10/7/21 0439 EDT   Dilation complete date/time:      Start pushing:    Decision time (emergent ):       Assisted Delivery Details    Forceps attempted?: No  Vacuum extractor attempted?: No     Document Additional Attempt       Document Additional Attempt             Shoulder Dystocia    Shoulder dystocia present?: No  Add Second Maneuver  Add Third Maneuver  Add Fourth Maneuver  Add Fifth Maneuver  Add Sixth Maneuver  Add Seventh Maneuver  Add Eighth Maneuver  Add Ninth Maneuver      Presentation    Presentation: Vertex  _: Occiput  _: Anterior      Information     Changing the 's delivery date/time could affect patient care.:    Delivery date/time:      Delivery type: Vaginal, Spontaneous    Details:        Delivery Providers    Delivering clinician: Judah Carney MD     Cord    Vessels: 3 Vessels  Complications: None  Gases sent?: No  Stem cell collection (by provider):  No     Placenta    Removal: Spontaneous  Appearance: Intact      Measurements       Delivery Information    Episiotomy: None  Perineal lacerations: None    Vaginal laceration: No    Cervical laceration: No    Surgical or additional est. blood loss (mL): 0 (View Only): Edit in [employer] Combined est. blood loss (mL): 0  Repair suture: None     Vaginal Delivery Counts    Initial count personnel: [facility]  Initial count verified by: Basim Metzger   4x4:  Needles:  Instruments:  Lap Pads:  Sponges:    Initial counts:         Final counts:            Other Procedures    Procedures: None          Department of Obstetrics and Gynecology  Spontaneous Vaginal Delivery Note    Labor & Delivery Summary  Labor Onset Date: 10/07/21  Labor Onset Time: 0439    Pre-operative Diagnosis:  Term pregnancy and Spontaneous labor    Post-operative Diagnosis:  Same + live female     Procedure:  Spontaneous vaginal delivery    Surgeon:  Caleb Mckeon MD    Information for the patient's :  Naa Gamboa [[id number]]          Anesthesia:  Epidural    Estimated blood loss:  300ml    Specimen:  Placenta not sent to pathology     Cord blood sent Yes    Complications:  none    Condition:  patient and infant stable    Details of Procedure: The patient is a 32 y.o. female at 38w3d   OB History        2    Para   1    Term   1            AB        Living           SAB        TAB        Ectopic        Molar        Multiple        Live Births                 who was admitted for active phase labor. She received the following interventions: ARBOW and IV Pitocin augmentation She was known to be GBS positive and did receive antibiotic prophylaxis. The patient progressed well,did receive an epidural, became complete and started to push. After pushing for approx 30 min, the fetal head was at the perineum, nose and mouth suctioned with bulb suction and the rest of the infant delivered atraumatically, placed on mother abdomen. Cord was clamped and cut and infant placed in kangaroo care. The delivery of the placenta was spontaneous. The perineum and vagina were explored and  no laceration was found. Infant's name is unknown.       Electronically signed by Caleb Mckeon MD on 10/7/2021 at 11:31 AM

## 2021-10-07 NOTE — ANESTHESIA PROCEDURE NOTES
Epidural Block    Patient location during procedure: OB  Start time: 10/7/2021 7:40 AM  End time: 10/7/2021 7:52 AM  Reason for block: procedure for pain and labor epidural  Staffing  Performed: resident/CRNA   Resident/CRNA: NICOLE Guerrero CRNA  Preanesthetic Checklist  Completed: patient identified, IV checked, site marked, risks and benefits discussed, surgical consent, monitors and equipment checked, pre-op evaluation, timeout performed, anesthesia consent given, oxygen available and patient being monitored  Epidural  Patient position: sitting  Prep: ChloraPrep  Patient monitoring: cardiac monitor, continuous pulse ox, capnometry and frequent blood pressure checks  Approach: midline  Location: lumbar (1-5)  Injection technique: GOMEZ saline  Provider prep: mask and sterile gloves  Needle  Needle type: Tuohy   Needle gauge: 17 G  Needle length: 3.5 in  Needle insertion depth: 6 cm  Catheter type: end hole  Catheter size: 19 G  Catheter at skin depth: 11 cm  Test dose: negative  Assessment  Sensory level: T8  Hemodynamics: stable  Attempts: 1

## 2021-10-08 PROCEDURE — 6370000000 HC RX 637 (ALT 250 FOR IP): Performed by: OBSTETRICS & GYNECOLOGY

## 2021-10-08 PROCEDURE — 1220000000 HC SEMI PRIVATE OB R&B

## 2021-10-08 RX ORDER — IBUPROFEN 800 MG/1
800 TABLET ORAL EVERY 8 HOURS PRN
Qty: 30 TABLET | Refills: 1 | Status: SHIPPED | OUTPATIENT
Start: 2021-10-08

## 2021-10-08 RX ADMIN — DOCUSATE SODIUM 100 MG: 100 CAPSULE ORAL at 09:19

## 2021-10-08 RX ADMIN — IBUPROFEN 800 MG: 800 TABLET, FILM COATED ORAL at 17:14

## 2021-10-08 RX ADMIN — DOCUSATE SODIUM 100 MG: 100 CAPSULE ORAL at 21:25

## 2021-10-08 RX ADMIN — IBUPROFEN 800 MG: 800 TABLET, FILM COATED ORAL at 09:19

## 2021-10-08 ASSESSMENT — PAIN SCALES - GENERAL
PAINLEVEL_OUTOF10: 1
PAINLEVEL_OUTOF10: 3
PAINLEVEL_OUTOF10: 0

## 2021-10-08 NOTE — FLOWSHEET NOTE
Pt awake and resting in bed. Informed pt if she needs nurse to call number listed on white board. Pt verbalizes understanding. Infant in room with pt. Encouraged pt to have baby skin-to-skin at this time and as much throughout her stay in the hospital as possible. Informed pt that skin-to-skin is beneficial for the baby. Pt verbalizes understanding. Assessment completed.

## 2021-10-08 NOTE — ANESTHESIA POSTPROCEDURE EVALUATION
Department of Anesthesiology  Postprocedure Note    Patient: Alex Argueta  MRN: [id number]  YOB: 1990  Date of evaluation: 10/8/2021  Time:  6:49 AM     Procedure Summary     Date: 10/07/21 Room / Location:     Anesthesia Start: 0740 Anesthesia Stop: 7061    Procedure: Labor Analgesia Diagnosis:     Scheduled Providers:  Responsible Provider: NICOLE Diehl CRNA    Anesthesia Type: epidural ASA Status: 2          Anesthesia Type: epidural    Sony Phase I: Sony Score: 10    Sony Phase II: Sony Score: 10    Last vitals: Reviewed and per EMR flowsheets.        Anesthesia Post Evaluation    Patient location during evaluation: bedside  Patient participation: complete - patient participated  Level of consciousness: sleepy but conscious  Pain score: 1  Airway patency: patent  Nausea & Vomiting: no nausea and no vomiting  Complications: no  Cardiovascular status: blood pressure returned to baseline and hemodynamically stable  Respiratory status: acceptable  Hydration status: stable

## 2021-10-08 NOTE — PLAN OF CARE
Problem: Fluid Volume - Imbalance:  Goal: Absence of imbalanced fluid volume signs and symptoms  Description: Absence of imbalanced fluid volume signs and symptoms  10/8/2021 1407 by Tim Rosales. Jayne Thorne RN  Outcome: Met This Shift  10/8/2021 0044 by Ava Casiano RN  Outcome: Ongoing  Goal: Absence of intrapartum hemorrhage signs and symptoms  Description: Absence of intrapartum hemorrhage signs and symptoms  10/8/2021 1407 by Tim Rosales. Jayne Thorne RN  Outcome: Met This Shift  10/8/2021 0044 by Ava Casiano RN  Outcome: Ongoing  Goal: Absence of postpartum hemorrhage signs and symptoms  Description: Absence of postpartum hemorrhage signs and symptoms  10/8/2021 1407 by Tim Rosales. Jayne Thorne RN  Outcome: Met This Shift  10/8/2021 0044 by Ava Casiano RN  Outcome: Ongoing     Problem: Infection - Intrapartum Infection:  Goal: Will show no infection signs and symptoms  Description: Will show no infection signs and symptoms  10/8/2021 1407 by Tim Rosales. Jayne Thorne RN  Outcome: Met This Shift  10/8/2021 0044 by Ava Casiano RN  Outcome: Ongoing     Problem: Pain - Acute:  Goal: Pain level will decrease  Description: Pain level will decrease  10/8/2021 1407 by Tim Rosales. Jayne Thorne RN  Outcome: Met This Shift  10/8/2021 0044 by Ava Casiano RN  Outcome: Ongoing  Goal: Able to cope with pain  Description: Able to cope with pain  10/8/2021 1407 by Tim Rosales. Jayne Thorne RN  Outcome: Met This Shift  10/8/2021 0044 by Ava Casiano RN  Outcome: Ongoing     Problem: Pain:  Goal: Pain level will decrease  Description: Pain level will decrease  10/8/2021 1407 by Tim Rosales. Jayne Thorne RN  Outcome: Met This Shift  10/8/2021 0044 by Ava Casiano RN  Outcome: Ongoing  Goal: Control of acute pain  Description: Control of acute pain  10/8/2021 1407 by Tim Rosales.  Jayne Thorne RN  Outcome: Met This Shift  10/8/2021 0044 by Ava Casiano RN  Outcome: Ongoing  Goal: Control of chronic pain  Description: Control of chronic pain  10/8/2021 1407 by Justino Duffy RN  Outcome: Met This Shift  10/8/2021 0044 by Shannan Higuera RN  Outcome: Ongoing     Problem: Discharge Planning:  Goal: Discharged to appropriate level of care  Description: Discharged to appropriate level of care  10/8/2021 1407 by Lucila Dominguez. Garret Duffy RN  Outcome: Met This Shift  10/8/2021 0044 by Shannan Higuera RN  Outcome: Ongoing     Problem: Constipation:  Goal: Bowel elimination is within specified parameters  Description: Bowel elimination is within specified parameters  10/8/2021 1407 by Lucila Dominguez. Garret Duffy RN  Outcome: Met This Shift  10/8/2021 0044 by Shannan Higuera RN  Outcome: Ongoing     Problem: Infection - Risk of, Puerperal Infection:  Goal: Will show no infection signs and symptoms  Description: Will show no infection signs and symptoms  10/8/2021 1407 by Lucila Dominguez. Garret Duffy RN  Outcome: Met This Shift  10/8/2021 0044 by Shannan Higuera RN  Outcome: Ongoing     Problem: Mood - Altered:  Goal: Mood stable  Description: Mood stable  10/8/2021 1407 by Lucila Dominguez.  Garret Duffy RN  Outcome: Completed  10/8/2021 0044 by Shannan Higuera RN  Outcome: Ongoing

## 2021-10-08 NOTE — PLAN OF CARE
Problem: Anxiety:  Goal: Level of anxiety will decrease  Description: Level of anxiety will decrease  10/8/2021 0044 by Sonia Dunne RN  Outcome: Ongoing  10/7/2021 1405 by Luke Evans RN  Outcome: Met This Shift     Problem: Breathing Pattern - Ineffective:  Goal: Able to breathe comfortably  Description: Able to breathe comfortably  10/8/2021 0044 by Sonia Dunne RN  Outcome: Ongoing  10/7/2021 1405 by Luke Evans RN  Outcome: Met This Shift     Problem: Fluid Volume - Imbalance:  Goal: Absence of imbalanced fluid volume signs and symptoms  Description: Absence of imbalanced fluid volume signs and symptoms  10/8/2021 0044 by Sonia Dunne RN  Outcome: Ongoing  10/7/2021 1626 by Alissa Olmedo  Outcome: Ongoing  10/7/2021 1625 by Alissa Olmedo  Outcome: Ongoing  10/7/2021 1405 by Luke Evans RN  Outcome: Met This Shift  Goal: Absence of intrapartum hemorrhage signs and symptoms  Description: Absence of intrapartum hemorrhage signs and symptoms  10/8/2021 0044 by Sonia Dunne RN  Outcome: Ongoing  10/7/2021 1626 by Alissa Olmedo  Outcome: Ongoing  10/7/2021 1625 by Alissa Olmedo  Outcome: Ongoing  10/7/2021 1405 by Luke Evans RN  Outcome: Met This Shift  Goal: Absence of postpartum hemorrhage signs and symptoms  Description: Absence of postpartum hemorrhage signs and symptoms  10/8/2021 0044 by Sonia Dunne RN  Outcome: Ongoing  10/7/2021 1626 by Alissa Olmedo  Outcome: Ongoing  10/7/2021 1625 by Alissa Olmedo  Outcome: Ongoing     Problem: Infection - Intrapartum Infection:  Goal: Will show no infection signs and symptoms  Description: Will show no infection signs and symptoms  10/8/2021 0044 by Sonia Dunne RN  Outcome: Ongoing  10/7/2021 1626 by Alissa Olmedo  Outcome: Ongoing  10/7/2021 1625 by Alissa Olmedo  Outcome: Ongoing  10/7/2021 1405 by Luke Evans RN  Outcome: Met This Shift     Problem: Labor Process - Prolonged:  Goal: Labor progression, first stage, within specified pattern  Description: Labor progression, first stage, within specified pattern  10/8/2021 0044 by Caroline Pino RN  Outcome: Ongoing  10/7/2021 1405 by See Munoz RN  Outcome: Met This Shift  Goal: Labor progession, second stage, within specified pattern  Description: Labor progession, second stage, within specified pattern  10/8/2021 0044 by Caroline Pino RN  Outcome: Ongoing  10/7/2021 1405 by See Munoz RN  Outcome: Met This Shift  Goal: Uterine contractions within specified parameters  Description: Uterine contractions within specified parameters  10/8/2021 0044 by Caroline Pino RN  Outcome: Ongoing  10/7/2021 1405 by See Munoz RN  Outcome: Met This Shift     Problem:  Screening:  Goal: Ability to make informed decisions regarding treatment has improved  Description: Ability to make informed decisions regarding treatment has improved  10/8/2021 004 by Caroline Pino RN  Outcome: Ongoing  10/7/2021 1405 by See Munoz RN  Outcome: Met This Shift     Problem: Pain - Acute:  Goal: Pain level will decrease  Description: Pain level will decrease  10/8/2021 0044 by Caroline Pino RN  Outcome: Ongoing  10/7/2021 1626 by Frederick Bonilla  Outcome: Ongoing  10/7/2021 1625 by Frederick Bonilla  Outcome: Ongoing  10/7/2021 1405 by See Munoz RN  Outcome: Met This Shift  Goal: Able to cope with pain  Description: Able to cope with pain  10/8/2021 0044 by Caroline Pino RN  Outcome: Ongoing  10/7/2021 1626 by Frederick Bonilla  Outcome: Ongoing  10/7/2021 1625 by Frederick Bonilla  Outcome: Ongoing  10/7/2021 1405 by See Munoz RN  Outcome: Met This Shift     Problem: Tissue Perfusion - Uteroplacental, Altered:  Goal: Absence of abnormal fetal heart rate pattern  Description: Absence of abnormal fetal heart rate pattern  10/8/2021 004 by Caroline Pino RN  Outcome: Ongoing  10/7/2021 1405 by See Munoz RN  Outcome: Met This Shift     Problem: Urinary Retention:  Goal: Experiences of bladder distention will decrease  Description: Experiences of bladder distention will decrease  10/8/2021 0044 by Sheela Griffith RN  Outcome: Ongoing  10/7/2021 1405 by Iman Higuera RN  Outcome: Met This Shift  Goal: Urinary elimination within specified parameters  Description: Urinary elimination within specified parameters  10/8/2021 0044 by Sheela Griffith RN  Outcome: Ongoing  10/7/2021 1405 by Iman Higuera RN  Outcome: Met This Shift     Problem: Pain:  Goal: Pain level will decrease  Description: Pain level will decrease  10/8/2021 0044 by Sheela Griffith RN  Outcome: Ongoing  10/7/2021 1626 by Aziza Souza  Outcome: Ongoing  10/7/2021 1625 by Aziza Souza  Outcome: Ongoing  10/7/2021 1405 by Iman Higuera RN  Outcome: Met This Shift  Goal: Control of acute pain  Description: Control of acute pain  10/8/2021 0044 by Sheela Griffith RN  Outcome: Ongoing  10/7/2021 1405 by Iman Higuera RN  Outcome: Met This Shift  Goal: Control of chronic pain  Description: Control of chronic pain  10/8/2021 0044 by Sheela Griffith RN  Outcome: Ongoing  10/7/2021 1405 by Iman Higuera RN  Outcome: Met This Shift     Problem: Discharge Planning:  Goal: Discharged to appropriate level of care  Description: Discharged to appropriate level of care  10/8/2021 0044 by Sheela Griffith RN  Outcome: Ongoing  10/7/2021 1626 by Aziza Souza  Outcome: Ongoing  10/7/2021 1625 by Aziza Souza  Outcome: Ongoing     Problem: Constipation:  Goal: Bowel elimination is within specified parameters  Description: Bowel elimination is within specified parameters  10/8/2021 0044 by Sheela Griffith RN  Outcome: Ongoing  10/7/2021 1626 by Aziza Souza  Outcome: Ongoing  10/7/2021 1625 by Aziza Souza  Outcome: Ongoing     Problem: Infection - Risk of, Puerperal Infection:  Goal: Will show no infection signs and symptoms  Description: Will show no infection signs and symptoms  10/8/2021 0044 by Kady Carr RN  Outcome: Ongoing  10/7/2021 1626 by Rolando Brown  Outcome: Ongoing  10/7/2021 1625 by Rolando Brown  Outcome: Ongoing  10/7/2021 1405 by Darryn Rothman RN  Outcome: Met This Shift     Problem: Mood - Altered:  Goal: Mood stable  Description: Mood stable  10/8/2021 0044 by Kady Carr RN  Outcome: Ongoing  10/7/2021 1626 by Rolando Brown  Outcome: Ongoing  10/7/2021 1625 by Rolando Brown  Outcome: Ongoing

## 2021-10-08 NOTE — PROGRESS NOTES
Subjective:     Postpartum Day 1:   The patient feels well and denies emotional concerns. Pain is well controlled with current medications. The baby is well. The patient is ambulating well and is tolerating a normal diet. Objective:        Vitals:    10/08/21 0215   BP: 121/81   Pulse: 92   Resp: 20   Temp: 97.5 °F (36.4 °C)   SpO2: 98%     Lab Results   Component Value Date    WBC 16.2 (H) 10/07/2021    HGB 11.3 (L) 10/07/2021    HCT 35.0 (L) 10/07/2021    MCV 94.3 10/07/2021     10/07/2021       General:    Alert, appears stated age, cooperative   Lochia:  appropriate   Uterine    Fundus firm and without tenderness   DVT Evaluation:  No evidence of DVT on exam     Assessment:     Postpartum day 1 Doing well postoperatively. Plan:     Continue current care.     Electronically signed by Noreen Mitchell MD on 10/8/2021 at 7:51 AM

## 2021-10-09 VITALS
WEIGHT: 210 LBS | DIASTOLIC BLOOD PRESSURE: 81 MMHG | OXYGEN SATURATION: 99 % | TEMPERATURE: 98 F | BODY MASS INDEX: 38.64 KG/M2 | HEIGHT: 62 IN | HEART RATE: 85 BPM | RESPIRATION RATE: 18 BRPM | SYSTOLIC BLOOD PRESSURE: 127 MMHG

## 2021-10-09 PROCEDURE — 6370000000 HC RX 637 (ALT 250 FOR IP): Performed by: OBSTETRICS & GYNECOLOGY

## 2021-10-09 RX ADMIN — IBUPROFEN 800 MG: 800 TABLET, FILM COATED ORAL at 11:03

## 2021-10-09 RX ADMIN — DOCUSATE SODIUM 100 MG: 100 CAPSULE ORAL at 09:55

## 2021-10-09 RX ADMIN — FERROUS SULFATE TAB 325 MG (65 MG ELEMENTAL FE) 325 MG: 325 (65 FE) TAB at 09:50

## 2021-10-09 RX ADMIN — IBUPROFEN 800 MG: 800 TABLET, FILM COATED ORAL at 02:10

## 2021-10-09 ASSESSMENT — PAIN - FUNCTIONAL ASSESSMENT
PAIN_FUNCTIONAL_ASSESSMENT: ACTIVITIES ARE NOT PREVENTED
PAIN_FUNCTIONAL_ASSESSMENT: ACTIVITIES ARE NOT PREVENTED

## 2021-10-09 ASSESSMENT — PAIN DESCRIPTION - LOCATION
LOCATION: ABDOMEN
LOCATION: ABDOMEN

## 2021-10-09 ASSESSMENT — PAIN DESCRIPTION - DESCRIPTORS
DESCRIPTORS: CRAMPING
DESCRIPTORS: CRAMPING

## 2021-10-09 ASSESSMENT — PAIN DESCRIPTION - FREQUENCY
FREQUENCY: INTERMITTENT
FREQUENCY: INTERMITTENT

## 2021-10-09 ASSESSMENT — PAIN DESCRIPTION - PAIN TYPE
TYPE: ACUTE PAIN
TYPE: ACUTE PAIN

## 2021-10-09 ASSESSMENT — PAIN SCALES - GENERAL
PAINLEVEL_OUTOF10: 1
PAINLEVEL_OUTOF10: 0
PAINLEVEL_OUTOF10: 3
PAINLEVEL_OUTOF10: 0

## 2021-10-09 ASSESSMENT — PAIN DESCRIPTION - PROGRESSION
CLINICAL_PROGRESSION: GRADUALLY WORSENING
CLINICAL_PROGRESSION: GRADUALLY IMPROVING

## 2021-10-09 ASSESSMENT — PAIN DESCRIPTION - ORIENTATION
ORIENTATION: LOWER
ORIENTATION: LOWER

## 2021-10-09 ASSESSMENT — PAIN DESCRIPTION - ONSET
ONSET: ON-GOING
ONSET: ON-GOING

## 2021-10-09 NOTE — PLAN OF CARE
Problem: Anxiety:  Goal: Level of anxiety will decrease  Description: Level of anxiety will decrease  10/9/2021 0853 by Seth Hamilton RN  Outcome: Completed  10/9/2021 0003 by Kady Carr RN  Outcome: Ongoing

## 2021-10-09 NOTE — PLAN OF CARE
Problem: Anxiety:  Goal: Level of anxiety will decrease  Description: Level of anxiety will decrease  10/9/2021 0853 by Chinedu Arreola RN  Outcome: Completed  10/9/2021 0003 by Randall Gee RN  Outcome: Ongoing

## 2021-10-09 NOTE — DISCHARGE SUMMARY
Obstetrical Discharge Form    Main OB/GYN Provider   [x]  [facility] [address]   []  [address]  []  [facility]      Gestational Age:  39w3d    Antepartum complications: None    Date of Delivery:   10/7/21      Type of Delivery:   vaginal, spontaneous    Delivered By:     []  Courtney Hernandez MD   []  Liv Delgado MD   [x]  Dennis Arambula MD   []  Jeana Denis MD   []  Zhanna Franklin MD        Baby:       Information for the patient's :  Angela Cam[facility] [[id number]]          Anesthesia:    Epidural    Intrapartum complications: None    Feeding method:   Breast    Postpartum complications: None    Discharge Date:   10/9/21    Discharge Condition:           Good    Plan:   Follow up    6 Weeks Postpartum      Electronically signed by Josep Keller MD on 10/9/2021 at 8:37 AM

## 2021-10-09 NOTE — PLAN OF CARE
Problem: Anxiety:  Goal: Level of anxiety will decrease  Description: Level of anxiety will decrease  Outcome: Ongoing     Problem: Breathing Pattern - Ineffective:  Goal: Able to breathe comfortably  Description: Able to breathe comfortably  Outcome: Ongoing     Problem: Fluid Volume - Imbalance:  Goal: Absence of imbalanced fluid volume signs and symptoms  Description: Absence of imbalanced fluid volume signs and symptoms  10/9/2021 0003 by Patricia Connors RN  Outcome: Ongoing  10/8/2021 1407 by Miki Dodd. Wale Gibson RN  Outcome: Met This Shift  Goal: Absence of intrapartum hemorrhage signs and symptoms  Description: Absence of intrapartum hemorrhage signs and symptoms  10/9/2021 0003 by Patricia Connors RN  Outcome: Ongoing  10/8/2021 1407 by Miki Dodd. Wale Gibson RN  Outcome: Met This Shift  Goal: Absence of postpartum hemorrhage signs and symptoms  Description: Absence of postpartum hemorrhage signs and symptoms  10/9/2021 0003 by Patricia Connors RN  Outcome: Ongoing  10/8/2021 1407 by Miki Dodd. Wale Gibson RN  Outcome: Met This Shift     Problem: Infection - Intrapartum Infection:  Goal: Will show no infection signs and symptoms  Description: Will show no infection signs and symptoms  10/9/2021 0003 by Patricia Connors RN  Outcome: Ongoing  10/8/2021 1407 by Miki Dodd.  Wale Gibson RN  Outcome: Met This Shift     Problem: Labor Process - Prolonged:  Goal: Labor progression, first stage, within specified pattern  Description: Labor progression, first stage, within specified pattern  Outcome: Ongoing  Goal: Labor progession, second stage, within specified pattern  Description: Labor progession, second stage, within specified pattern  Outcome: Ongoing  Goal: Uterine contractions within specified parameters  Description: Uterine contractions within specified parameters  Outcome: Ongoing     Problem:  Screening:  Goal: Ability to make informed decisions regarding treatment has improved  Description: Ability to make informed decisions regarding treatment has improved  Outcome: Ongoing     Problem: Pain - Acute:  Goal: Pain level will decrease  Description: Pain level will decrease  10/9/2021 0003 by Bong Garg RN  Outcome: Ongoing  10/8/2021 1407 by Marcello Maher RN  Outcome: Met This Shift  Goal: Able to cope with pain  Description: Able to cope with pain  10/9/2021 0003 by Bong Garg RN  Outcome: Ongoing  10/8/2021 1407 by Marcello Maher RN  Outcome: Met This Shift     Problem: Tissue Perfusion - Uteroplacental, Altered:  Goal: Absence of abnormal fetal heart rate pattern  Description: Absence of abnormal fetal heart rate pattern  Outcome: Ongoing     Problem: Urinary Retention:  Goal: Experiences of bladder distention will decrease  Description: Experiences of bladder distention will decrease  Outcome: Ongoing  Goal: Urinary elimination within specified parameters  Description: Urinary elimination within specified parameters  Outcome: Ongoing     Problem: Pain:  Goal: Pain level will decrease  Description: Pain level will decrease  10/9/2021 0003 by Bong Garg RN  Outcome: Ongoing  10/8/2021 1407 by Marcello Maher RN  Outcome: Met This Shift  Goal: Control of acute pain  Description: Control of acute pain  10/9/2021 0003 by Bong Garg RN  Outcome: Ongoing  10/8/2021 1407 by Marcello Maher RN  Outcome: Met This Shift  Goal: Control of chronic pain  Description: Control of chronic pain  10/9/2021 0003 by Bong Garg RN  Outcome: Ongoing  10/8/2021 1407 by Marcello Maher RN  Outcome: Met This Shift     Problem: Discharge Planning:  Goal: Discharged to appropriate level of care  Description: Discharged to appropriate level of care  10/9/2021 0003 by Bong Garg RN  Outcome: Ongoing  10/8/2021 1407 by Marcello Maher RN  Outcome: Met This Shift     Problem: Constipation:  Goal: Bowel elimination is within specified parameters  Description: Bowel elimination is within specified parameters  10/9/2021 0003 by Vijay Riley RN  Outcome: Ongoing  10/8/2021 1407 by Mabel Mcghee. Esther Fernandez RN  Outcome: Met This Shift     Problem: Infection - Risk of, Puerperal Infection:  Goal: Will show no infection signs and symptoms  Description: Will show no infection signs and symptoms  10/9/2021 0003 by Vijay Riley RN  Outcome: Ongoing  10/8/2021 1407 by Mabel Mcghee.  Esther Fernandez RN  Outcome: Met This Shift